# Patient Record
Sex: FEMALE
[De-identification: names, ages, dates, MRNs, and addresses within clinical notes are randomized per-mention and may not be internally consistent; named-entity substitution may affect disease eponyms.]

---

## 2018-10-09 PROBLEM — Z00.00 ENCOUNTER FOR PREVENTIVE HEALTH EXAMINATION: Status: ACTIVE | Noted: 2018-10-09

## 2018-11-08 ENCOUNTER — APPOINTMENT (OUTPATIENT)
Dept: PLASTIC SURGERY | Facility: CLINIC | Age: 28
End: 2018-11-08
Payer: COMMERCIAL

## 2018-11-08 PROCEDURE — 99214 OFFICE O/P EST MOD 30 MIN: CPT

## 2019-01-29 ENCOUNTER — RESULT REVIEW (OUTPATIENT)
Age: 29
End: 2019-01-29

## 2019-01-29 ENCOUNTER — APPOINTMENT (OUTPATIENT)
Dept: PLASTIC SURGERY | Facility: CLINIC | Age: 29
End: 2019-01-29
Payer: COMMERCIAL

## 2019-01-29 DIAGNOSIS — D36.10 BENIGN NEOPLASM OF PERIPHERAL NERVES AND AUTONOMIC NERVOUS SYSTEM, UNSPECIFIED: ICD-10-CM

## 2019-01-29 DIAGNOSIS — D21.12 BENIGN NEOPLASM OF CONNECTIVE AND OTHER SOFT TISSUE OF LEFT UPPER LIMB, INCLUDING SHOULDER: ICD-10-CM

## 2019-01-29 DIAGNOSIS — R22.2 LOCALIZED SWELLING, MASS AND LUMP, TRUNK: ICD-10-CM

## 2019-01-29 PROCEDURE — 23071 EXC SHOULDER LES SC 3 CM/>: CPT | Mod: 59,LT

## 2019-01-29 PROCEDURE — 13101 CMPLX RPR TRUNK 2.6-7.5 CM: CPT | Mod: 59

## 2019-01-29 PROCEDURE — 21012 EXC FACE LES SBQ 2 CM/>: CPT

## 2019-01-30 ENCOUNTER — OUTPATIENT (OUTPATIENT)
Dept: OUTPATIENT SERVICES | Facility: HOSPITAL | Age: 29
LOS: 1 days | End: 2019-01-30
Payer: COMMERCIAL

## 2019-01-30 DIAGNOSIS — R22.2 LOCALIZED SWELLING, MASS AND LUMP, TRUNK: ICD-10-CM

## 2019-01-30 DIAGNOSIS — L72.11 PILAR CYST: ICD-10-CM

## 2019-01-30 PROCEDURE — 88305 TISSUE EXAM BY PATHOLOGIST: CPT

## 2019-01-30 PROCEDURE — 88341 IMHCHEM/IMCYTCHM EA ADD ANTB: CPT

## 2019-01-30 PROCEDURE — 88360 TUMOR IMMUNOHISTOCHEM/MANUAL: CPT

## 2019-02-05 ENCOUNTER — APPOINTMENT (OUTPATIENT)
Dept: PLASTIC SURGERY | Facility: CLINIC | Age: 29
End: 2019-02-05
Payer: COMMERCIAL

## 2019-02-05 VITALS
SYSTOLIC BLOOD PRESSURE: 121 MMHG | DIASTOLIC BLOOD PRESSURE: 73 MMHG | HEIGHT: 63 IN | WEIGHT: 148 LBS | BODY MASS INDEX: 26.22 KG/M2 | HEART RATE: 81 BPM

## 2019-02-05 DIAGNOSIS — L72.0 EPIDERMAL CYST: ICD-10-CM

## 2019-02-05 PROCEDURE — 99024 POSTOP FOLLOW-UP VISIT: CPT

## 2019-02-06 LAB — SURGICAL PATHOLOGY STUDY: SIGNIFICANT CHANGE UP

## 2019-02-13 PROBLEM — L72.0 EPIDERMAL INCLUSION CYST: Status: ACTIVE | Noted: 2019-02-13

## 2019-02-13 PROBLEM — D21.12: Status: ACTIVE | Noted: 2019-02-13

## 2019-02-13 PROBLEM — R22.2 LOCALIZED SWELLING, MASS AND LUMP, TRUNK: Status: ACTIVE | Noted: 2018-11-08

## 2019-02-13 PROBLEM — D36.10 BENIGN SCHWANNOMA: Status: ACTIVE | Noted: 2019-02-13

## 2019-02-13 NOTE — PHYSICAL EXAM
[de-identified] : scalp incision line C/D/I no infection - sutures removed [de-identified] : left shoulder incision C/D/I no collections or infection

## 2019-02-13 NOTE — PROCEDURE
[Nl] : None [FreeTextEntry1] : scalp SubQ mass and left Shoulder SubQ mass [FreeTextEntry2] : excision soft tissue mass scalp 2.5cm and shoulder 3.5cm, Complex closure 2.5 cm [FreeTextEntry6] : We first turned our attention to the left shoulder a 2.5 cm length incision was marked out overlying the mass local anesthetic and hemostatic solution was infiltrated and then the patient was prepped and draped in standard fashion and using a #15 blade I incised the skin and continued to incision down through the dermis until the underlying cystic mass was identified using a combination of the 15 blade as well a sharp scissors and blunt dissection I circumscribed and dissected the entire lesion which measured 3.5 cm x 2.5 cm. I freed up from all the surrounding tissue it was consistent with a sebaceous cyst or epidermal inclusion cyst and once it was fully excised within the field to pathology for evaluation the pocket was irrigated, hemostasis was obtained and then on a obliterate the dead space with 3-0 Vicryl sutures followed by 3-0 Vicryl suture to close the subcutaneous layer and 3-0 Monocryl buried deep dermal sutures followed by 4-0 Monocryl running subcutaneous suture this was reinforced with Mastisol Steri-Strips and a sterile dressing consisting of Telfa and Tegaderm.\par \par We then turned our attention to the scalp and reprepped and draped and infused local anesthetic and using a #15 blade I made a 2.0cm length incision directly overlying the mass. The mass was firm but nontender it was poorly circumscribed and I dissected widely in order to excise the entire lesion which extended down to the galea I. excised it in its entirety and sent the field to pathology and then closed the scalp with a combination of 3-0 Vicryl sutures to close the deeper subcutaneous tissue followed by 3-0 Monocryl buried deep dermal sutures and 4-0 Prolene running sutures to close the skin

## 2019-02-13 NOTE — HISTORY OF PRESENT ILLNESS
[FreeTextEntry1] : 28 year old presents for post op she is 1 week s/p excision of posterior scalp mass and left shoulder mass.

## 2019-03-15 ENCOUNTER — MESSAGE (OUTPATIENT)
Age: 29
End: 2019-03-15